# Patient Record
Sex: MALE | Race: BLACK OR AFRICAN AMERICAN | NOT HISPANIC OR LATINO | Employment: FULL TIME | URBAN - METROPOLITAN AREA
[De-identification: names, ages, dates, MRNs, and addresses within clinical notes are randomized per-mention and may not be internally consistent; named-entity substitution may affect disease eponyms.]

---

## 2018-10-03 ENCOUNTER — HOSPITAL ENCOUNTER (EMERGENCY)
Facility: MEDICAL CENTER | Age: 33
End: 2018-10-03
Attending: EMERGENCY MEDICINE
Payer: COMMERCIAL

## 2018-10-03 VITALS
OXYGEN SATURATION: 96 % | BODY MASS INDEX: 26.43 KG/M2 | WEIGHT: 195.11 LBS | HEIGHT: 72 IN | HEART RATE: 89 BPM | SYSTOLIC BLOOD PRESSURE: 138 MMHG | TEMPERATURE: 99.8 F | DIASTOLIC BLOOD PRESSURE: 90 MMHG | RESPIRATION RATE: 18 BRPM

## 2018-10-03 DIAGNOSIS — J02.9 PHARYNGITIS, UNSPECIFIED ETIOLOGY: ICD-10-CM

## 2018-10-03 PROCEDURE — 99283 EMERGENCY DEPT VISIT LOW MDM: CPT

## 2018-10-03 RX ORDER — PENICILLIN V POTASSIUM 500 MG/1
500 TABLET ORAL EVERY 6 HOURS
Qty: 40 TAB | Refills: 0 | Status: SHIPPED | OUTPATIENT
Start: 2018-10-03 | End: 2018-10-13

## 2018-10-03 RX ORDER — PREDNISONE 20 MG/1
40 TABLET ORAL DAILY
Qty: 12 TAB | Refills: 0 | Status: SHIPPED | OUTPATIENT
Start: 2018-10-03 | End: 2018-10-09

## 2018-10-03 ASSESSMENT — PAIN SCALES - GENERAL: PAINLEVEL_OUTOF10: 8

## 2018-10-03 NOTE — ED NOTES
Patient seen by doctor and discharged to home with instructions and prescriptions.  Patient to follow up with his doctor or return here for worsening condition

## 2018-10-03 NOTE — ED PROVIDER NOTES
"ED Provider Note    CHIEF COMPLAINT  Chief Complaint   Patient presents with   • Sore Throat     sore throat x one day       HPI  Wu Smith is a 33 y.o. male who presents for evaluation of sore throat.  Patient presents with 1 day history of progressive sore throat.  Patient denies: Nasal congestion, rhinorrhea, cough, sputum, abdominal pain, gastrointestinal symptoms, rashes.  Feels like he has had a low-grade fever.  No other acute symptomatology or complaints.    REVIEW OF SYSTEMS  See HPI for further details.  No major health problems such as: Hypertension, diabetes, cardiopulmonary disorders, gastrointestinal disorders.  All other systems negative.    PAST MEDICAL HISTORY  History reviewed. No pertinent past medical history.    FAMILY HISTORY  History reviewed. No pertinent family history.    SOCIAL HISTORY  Previous tobacco use; positive marijuana use;    SURGICAL HISTORY  History reviewed. No pertinent surgical history.    CURRENT MEDICATIONS  None    ALLERGIES  No Known Allergies    PHYSICAL EXAM  VITAL SIGNS: /90   Pulse 89   Temp 37.7 °C (99.8 °F)   Resp 18   Ht 1.829 m (6')   Wt 88.5 kg (195 lb 1.7 oz)   SpO2 96%   BMI 26.46 kg/m²    Constitutional: 33-year-old black male, well developed, Well nourished, No acute distress, Non-toxic appearance.   HENT: Normocephalic, Atraumatic, Nares:Clear, Oropharynx: moist, well hydrated, posterior pharynx: Bilaterally enlarged tonsils with scant exudate bilaterally, no unilateral swelling, uvula is midline, phonation is normal with no evidence of \"hot potato voice\".  Eyes: PERRL, EOMI, Conjunctiva normal, No discharge.   Neck: Normal range of motion, No tenderness, Supple, No stridor.   Lymphatic: Tender submandibular lymphadenopathy noted.   Cardiovascular: Regular rate and rhythm without mumurs, gallups, rubs   Thorax & Lungs: Normal Equal breath sounds, No respiratory distress, No wheezing, no stridor, no rales. No chest tenderness. "   Abdomen: Soft, nontender, nondistended, no organomegaly, positive bowel sounds normal in quality. No guarding or rebound.  Skin: Good skin turgor, pink, warm, dry. No rashes, petechiae, purpura. Normal capillary refill.   Neurologic: Alert & oriented x 3,  No gross focal deficits noted.   Psychiatric: Affect normal, Judgment normal, Mood normal.     COURSE & MEDICAL DECISION MAKING  Pertinent Labs & Imaging studies reviewed. (See chart for details)  Discussion: At this time, the patient presents for evaluation of a sore throat.  Patient has an exudative pharyngitis.  There is no evidence of peritonsillar abscess or symptoms of GI gas epiglottitis or retropharyngeal abscess.  Patient has no clinical findings that I feel antibiotic therapy is indicated.  I discussed the findings and treatment plan with the patient.  He indicates he is comfortable with this explanation disposition.    FINAL IMPRESSION  1. Pharyngitis, unspecified etiology           PLAN  1.  Appropriate discharge instructions given  2.  Pen-Vee K 500 mg #40  3.  Prednisone 40 mg daily times 6 days  4.  Follow-up with primary care in  5.  Recheck at [facility] if change worsening symptoms, as we do not have ENT backup at this institution, if needed;    Electronically signed by: Guy G Gansert, 10/3/2018 8:19 AM